# Patient Record
Sex: MALE | Race: WHITE | NOT HISPANIC OR LATINO
[De-identification: names, ages, dates, MRNs, and addresses within clinical notes are randomized per-mention and may not be internally consistent; named-entity substitution may affect disease eponyms.]

---

## 2023-11-28 ENCOUNTER — NON-APPOINTMENT (OUTPATIENT)
Age: 68
End: 2023-11-28

## 2023-11-28 PROBLEM — Z00.00 ENCOUNTER FOR PREVENTIVE HEALTH EXAMINATION: Status: ACTIVE | Noted: 2023-11-28

## 2023-11-29 DIAGNOSIS — M25.561 PAIN IN RIGHT KNEE: ICD-10-CM

## 2023-12-01 ENCOUNTER — APPOINTMENT (OUTPATIENT)
Dept: ORTHOPEDIC SURGERY | Facility: CLINIC | Age: 68
End: 2023-12-01
Payer: COMMERCIAL

## 2023-12-01 ENCOUNTER — NON-APPOINTMENT (OUTPATIENT)
Age: 68
End: 2023-12-01

## 2023-12-01 VITALS — HEIGHT: 68 IN | BODY MASS INDEX: 22.73 KG/M2 | WEIGHT: 150 LBS

## 2023-12-01 PROCEDURE — 99204 OFFICE O/P NEW MOD 45 MIN: CPT | Mod: 25

## 2023-12-01 PROCEDURE — 73562 X-RAY EXAM OF KNEE 3: CPT | Mod: RT

## 2023-12-01 PROCEDURE — 20610 DRAIN/INJ JOINT/BURSA W/O US: CPT | Mod: RT

## 2023-12-04 ENCOUNTER — TRANSCRIPTION ENCOUNTER (OUTPATIENT)
Age: 68
End: 2023-12-04

## 2023-12-04 LAB
B PERT IGG+IGM PNL SER: ABNORMAL
COLOR FLD: YELLOW
EOSINOPHIL # FLD MANUAL: 0 %
FLUID INTAKE SUBSTANCE CLASS: NORMAL
LYMPHOCYTES # FLD MANUAL: 26 %
MESOTHL CELL NFR FLD: 0 %
MONOS+MACROS NFR FLD MANUAL: 68 %
NEUTS SEG # FLD MANUAL: 6 %
NRBC # FLD: 0 %
RBC # FLD MANUAL: 4000 /UL
SYCRY CLARITY: ABNORMAL
SYCRY COLOR: ABNORMAL
SYCRY ID: ABNORMAL
SYCRY TUBE: NORMAL
TOTAL CELLS COUNTED FLD: 399 /UL
TUBE TYPE: NORMAL
UNIDENT CELLS NFR FLD MANUAL: 0 %
VARIANT LYMPHS # FLD MANUAL: 0 %

## 2023-12-07 LAB — GRAM STN ASPIRATE: NORMAL

## 2023-12-10 ENCOUNTER — NON-APPOINTMENT (OUTPATIENT)
Age: 68
End: 2023-12-10

## 2023-12-18 ENCOUNTER — APPOINTMENT (OUTPATIENT)
Dept: ORTHOPEDIC SURGERY | Facility: CLINIC | Age: 68
End: 2023-12-18
Payer: COMMERCIAL

## 2023-12-18 PROCEDURE — G2012 BRIEF CHECK IN BY MD/QHP: CPT

## 2024-02-16 ENCOUNTER — OUTPATIENT (OUTPATIENT)
Dept: OUTPATIENT SERVICES | Facility: HOSPITAL | Age: 69
LOS: 1 days | Discharge: HOME | End: 2024-02-16
Payer: COMMERCIAL

## 2024-02-16 VITALS
TEMPERATURE: 98 F | OXYGEN SATURATION: 99 % | RESPIRATION RATE: 17 BRPM | HEIGHT: 68 IN | DIASTOLIC BLOOD PRESSURE: 86 MMHG | WEIGHT: 169.32 LBS | HEART RATE: 65 BPM | SYSTOLIC BLOOD PRESSURE: 140 MMHG

## 2024-02-16 DIAGNOSIS — Z96.651 PRESENCE OF RIGHT ARTIFICIAL KNEE JOINT: Chronic | ICD-10-CM

## 2024-02-16 DIAGNOSIS — G47.33 OBSTRUCTIVE SLEEP APNEA (ADULT) (PEDIATRIC): ICD-10-CM

## 2024-02-16 DIAGNOSIS — Z01.818 ENCOUNTER FOR OTHER PREPROCEDURAL EXAMINATION: ICD-10-CM

## 2024-02-16 DIAGNOSIS — I10 ESSENTIAL (PRIMARY) HYPERTENSION: ICD-10-CM

## 2024-02-16 DIAGNOSIS — M25.561 PAIN IN RIGHT KNEE: ICD-10-CM

## 2024-02-16 DIAGNOSIS — T84.82XA FIBROSIS DUE TO INTERNAL ORTHOPEDIC PROSTHETIC DEVICES, IMPLANTS AND GRAFTS, INITIAL ENCOUNTER: ICD-10-CM

## 2024-02-16 DIAGNOSIS — L40.9 PSORIASIS, UNSPECIFIED: ICD-10-CM

## 2024-02-16 DIAGNOSIS — Z98.890 OTHER SPECIFIED POSTPROCEDURAL STATES: Chronic | ICD-10-CM

## 2024-02-16 DIAGNOSIS — Z90.3 ACQUIRED ABSENCE OF STOMACH [PART OF]: Chronic | ICD-10-CM

## 2024-02-16 LAB
A1C WITH ESTIMATED AVERAGE GLUCOSE RESULT: 5.3 % — SIGNIFICANT CHANGE UP (ref 4–5.6)
ALBUMIN SERPL ELPH-MCNC: 3.6 G/DL — SIGNIFICANT CHANGE UP (ref 3.3–5)
ALP SERPL-CCNC: 85 U/L — SIGNIFICANT CHANGE UP (ref 40–120)
ALT FLD-CCNC: 25 U/L — SIGNIFICANT CHANGE UP (ref 12–78)
ANION GAP SERPL CALC-SCNC: 2 MMOL/L — LOW (ref 5–17)
APPEARANCE UR: CLEAR — SIGNIFICANT CHANGE UP
APTT BLD: 30.4 SEC — SIGNIFICANT CHANGE UP (ref 24.5–35.6)
AST SERPL-CCNC: 21 U/L — SIGNIFICANT CHANGE UP (ref 15–37)
BASOPHILS # BLD AUTO: 0.05 K/UL — SIGNIFICANT CHANGE UP (ref 0–0.2)
BASOPHILS NFR BLD AUTO: 1 % — SIGNIFICANT CHANGE UP (ref 0–2)
BILIRUB SERPL-MCNC: 1.7 MG/DL — HIGH (ref 0.2–1.2)
BILIRUB UR-MCNC: NEGATIVE — SIGNIFICANT CHANGE UP
BLD GP AB SCN SERPL QL: SIGNIFICANT CHANGE UP
BUN SERPL-MCNC: 14 MG/DL — SIGNIFICANT CHANGE UP (ref 7–23)
CALCIUM SERPL-MCNC: 8.7 MG/DL — SIGNIFICANT CHANGE UP (ref 8.5–10.1)
CHLORIDE SERPL-SCNC: 108 MMOL/L — SIGNIFICANT CHANGE UP (ref 96–108)
CO2 SERPL-SCNC: 31 MMOL/L — SIGNIFICANT CHANGE UP (ref 22–31)
COLOR SPEC: YELLOW — SIGNIFICANT CHANGE UP
CREAT SERPL-MCNC: 0.97 MG/DL — SIGNIFICANT CHANGE UP (ref 0.5–1.3)
DIFF PNL FLD: NEGATIVE — SIGNIFICANT CHANGE UP
EGFR: 86 ML/MIN/1.73M2 — SIGNIFICANT CHANGE UP
EOSINOPHIL # BLD AUTO: 0.12 K/UL — SIGNIFICANT CHANGE UP (ref 0–0.5)
EOSINOPHIL NFR BLD AUTO: 2.4 % — SIGNIFICANT CHANGE UP (ref 0–6)
ESTIMATED AVERAGE GLUCOSE: 105 MG/DL — SIGNIFICANT CHANGE UP (ref 68–114)
GLUCOSE SERPL-MCNC: 82 MG/DL — SIGNIFICANT CHANGE UP (ref 70–99)
GLUCOSE UR QL: NEGATIVE MG/DL — SIGNIFICANT CHANGE UP
HCT VFR BLD CALC: 42.4 % — SIGNIFICANT CHANGE UP (ref 39–50)
HGB BLD-MCNC: 13.9 G/DL — SIGNIFICANT CHANGE UP (ref 13–17)
IMM GRANULOCYTES NFR BLD AUTO: 0.2 % — SIGNIFICANT CHANGE UP (ref 0–0.9)
INR BLD: 0.91 RATIO — SIGNIFICANT CHANGE UP (ref 0.85–1.18)
KETONES UR-MCNC: NEGATIVE MG/DL — SIGNIFICANT CHANGE UP
LEUKOCYTE ESTERASE UR-ACNC: NEGATIVE — SIGNIFICANT CHANGE UP
LYMPHOCYTES # BLD AUTO: 1.3 K/UL — SIGNIFICANT CHANGE UP (ref 1–3.3)
LYMPHOCYTES # BLD AUTO: 25.5 % — SIGNIFICANT CHANGE UP (ref 13–44)
MCHC RBC-ENTMCNC: 27.7 PG — SIGNIFICANT CHANGE UP (ref 27–34)
MCHC RBC-ENTMCNC: 32.8 G/DL — SIGNIFICANT CHANGE UP (ref 32–36)
MCV RBC AUTO: 84.5 FL — SIGNIFICANT CHANGE UP (ref 80–100)
MONOCYTES # BLD AUTO: 0.37 K/UL — SIGNIFICANT CHANGE UP (ref 0–0.9)
MONOCYTES NFR BLD AUTO: 7.3 % — SIGNIFICANT CHANGE UP (ref 2–14)
MRSA PCR RESULT.: SIGNIFICANT CHANGE UP
NEUTROPHILS # BLD AUTO: 3.25 K/UL — SIGNIFICANT CHANGE UP (ref 1.8–7.4)
NEUTROPHILS NFR BLD AUTO: 63.6 % — SIGNIFICANT CHANGE UP (ref 43–77)
NITRITE UR-MCNC: NEGATIVE — SIGNIFICANT CHANGE UP
NRBC # BLD: 0 /100 WBCS — SIGNIFICANT CHANGE UP (ref 0–0)
PH UR: 7 — SIGNIFICANT CHANGE UP (ref 5–8)
PLATELET # BLD AUTO: 235 K/UL — SIGNIFICANT CHANGE UP (ref 150–400)
POTASSIUM SERPL-MCNC: 3.9 MMOL/L — SIGNIFICANT CHANGE UP (ref 3.5–5.3)
POTASSIUM SERPL-SCNC: 3.9 MMOL/L — SIGNIFICANT CHANGE UP (ref 3.5–5.3)
PROT SERPL-MCNC: 6.5 GM/DL — SIGNIFICANT CHANGE UP (ref 6–8.3)
PROT UR-MCNC: NEGATIVE MG/DL — SIGNIFICANT CHANGE UP
PROTHROM AB SERPL-ACNC: 10.9 SEC — SIGNIFICANT CHANGE UP (ref 9.5–13)
RBC # BLD: 5.02 M/UL — SIGNIFICANT CHANGE UP (ref 4.2–5.8)
RBC # FLD: 13 % — SIGNIFICANT CHANGE UP (ref 10.3–14.5)
S AUREUS DNA NOSE QL NAA+PROBE: SIGNIFICANT CHANGE UP
SODIUM SERPL-SCNC: 141 MMOL/L — SIGNIFICANT CHANGE UP (ref 135–145)
SP GR SPEC: 1.01 — SIGNIFICANT CHANGE UP (ref 1–1.03)
UROBILINOGEN FLD QL: 1 MG/DL — SIGNIFICANT CHANGE UP (ref 0.2–1)
VIT D25+D1,25 OH+D1,25 PNL SERPL-MCNC: 39.6 PG/ML — SIGNIFICANT CHANGE UP (ref 19.9–79.3)
WBC # BLD: 5.1 K/UL — SIGNIFICANT CHANGE UP (ref 3.8–10.5)
WBC # FLD AUTO: 5.1 K/UL — SIGNIFICANT CHANGE UP (ref 3.8–10.5)

## 2024-02-16 PROCEDURE — 93010 ELECTROCARDIOGRAM REPORT: CPT

## 2024-02-16 NOTE — OCCUPATIONAL THERAPY INITIAL EVALUATION ADULT - PERTINENT HX OF CURRENT PROBLEM, REHAB EVAL
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 Pt is scheduled for R TKR revision on 3/7/24.

## 2024-02-16 NOTE — H&P PST ADULT - LYMPHATIC
Normal rate, regular rhythm.  Heart sounds S1, S2.  No murmurs, rubs or gallops.
No lymphadedenopathy

## 2024-02-16 NOTE — H&P PST ADULT - NSICDXPASTSURGICALHX_GEN_ALL_CORE_FT
PAST SURGICAL HISTORY:  H/O total knee replacement, right     History of ankle surgery     S/P gastric sleeve procedure     S/P right knee arthroscopy

## 2024-02-16 NOTE — H&P PST ADULT - NSICDXPASTMEDICALHX_GEN_ALL_CORE_FT
PAST MEDICAL HISTORY:  HTN (hypertension)     ALBERTINA (obstructive sleep apnea)     Psoriasis

## 2024-02-16 NOTE — OCCUPATIONAL THERAPY INITIAL EVALUATION ADULT - ADDITIONAL COMMENTS
Pt lives with wife (Who can assist post op) in a private house with 3 steps with bilateral handrails (Far apart) to enter. Once inside, the pt main bedroom and bathroom is on that floor when entering. The pts bathroom has a walk in shower stall, fixed/retractable shower head, comfort height toilet seat and grab bars +. The pt deferred the 3/1 commode stating "I didn't need it last time I had surgery. I will be fine". OT educated pt on importance of 3/1 commode, but pt still deferred. The pt ambulates with no device and owns a SAC and a RW. The pt daily pain is a 0/10 at rest and a 6/10 with movement. The pt manages the pain with rest and Advil. The pt has no recent outpatient PT, Hx of falls and has no buckling of the knees. The pt wears glasses all the time, R handed, drives and has no hearing impairments.

## 2024-02-16 NOTE — H&P PST ADULT - ASSESSMENT
67 y/o male with HTN, ALBERTINA, psoriasis with pshx of Bariatric sleeve gastrectomy 10/2022, Rt TKA on 3/2021 here for presurgical examination for planned revision Right Total Knee replacement with Dr. Hanson on 3/7/2024. Denies history of ischemic heart disease, CHF, DM, CVA, TIA, or Kidney Disease. Denies resting or exertional chest pain, palpitations, headache, N/V, SOB, syncope or blurry vision. Denies personal or family hx of bleeding disorder or adverse reaction with anesthesia. Denies hx of DVT or PE. Denies recent travels in the past 30 days. No fever, SOB, cough, flu-like symptoms or body rash covid screen.  Goal To walk pain free  CAPRINI SCORE [CLOT]    AGE RELATED RISK FACTORS                                                       MOBILITY RELATED FACTORS  [ ] Age 41-60 years                                            (1 Point)                  [ ] Bed rest                                                        (1 Point)  [x ] Age: 61-74 years                                           (2 Points)                 [ ] Plaster cast                                                   (2 Points)  [ ] Age= 75 years                                              (3 Points)                 [ ] Bed bound for more than 72 hours                 (2 Points)    DISEASE RELATED RISK FACTORS                                               GENDER SPECIFIC FACTORS  [ ] Edema in the lower extremities                       (1 Point)                  [ ] Pregnancy                                                     (1 Point)  [ ] Varicose veins                                               (1 Point)                  [ ] Post-partum < 6 weeks                                   (1 Point)             [x ] BMI > 25 Kg/m2                                            (1 Point)                  [ ] Hormonal therapy  or oral contraception          (1 Point)                 [ ] Sepsis (in the previous month)                        (1 Point)                  [ ] History of pregnancy complications                 (1 point)  [ ] Pneumonia or serious lung disease                                               [ ] Unexplained or recurrent                     (1 Point)           (in the previous month)                               (1 Point)  [ ] Abnormal pulmonary function test                     (1 Point)                 SURGERY RELATED RISK FACTORS  [ ] Acute myocardial infarction                              (1 Point)                 [ ]  Section                                             (1 Point)  [ ] Congestive heart failure (in the previous month)  (1 Point)               [ ] Minor surgery                                                  (1 Point)   [ ] Inflammatory bowel disease                             (1 Point)                 [ ] Arthroscopic surgery                                        (2 Points)  [ ] Central venous access                                      (2 Points)                [ ] General surgery lasting more than 45 minutes   (2 Points)       [ ] Stroke (in the previous month)                          (5 Points)               [x ] Elective arthroplasty                                         (5 Points)                                                                                                                                               HEMATOLOGY RELATED FACTORS                                                 TRAUMA RELATED RISK FACTORS  [ ] Prior episodes of VTE                                     (3 Points)                [ ] Fracture of the hip, pelvis, or leg                       (5 Points)  [ ] Positive family history for VTE                         (3 Points)                 [ ] Acute spinal cord injury (in the previous month)  (5 Points)  [ ] Prothrombin 34192 A                                     (3 Points)                 [ ] Paralysis  (less than 1 month)                             (5 Points)  [ ] Factor V Leiden                                             (3 Points)                  [ ] Multiple Trauma within 1 month                        (5 Points)  [ ] Lupus anticoagulants                                     (3 Points)                                                           [ ] Anticardiolipin antibodies                               (3 Points)                                                       [ ] High homocysteine in the blood                      (3 Points)                                             [ ] Other congenital or acquired thrombophilia      (3 Points)                                                [ ] Heparin induced thrombocytopenia                  (3 Points)                                          Total Score [      8    ]    Caprini Score 0 - 2:  Low Risk, No VTE Prophylaxis required for most patients, encourage ambulation  Caprini Score 3 - 6:  At Risk, pharmacologic VTE prophylaxis is indicated for most patients (in the absence of a contraindication)  Caprini Score Greater than or = 7:  High Risk, pharmacologic VTE prophylaxis is indicated for most patients (in the absence of a contraindication)  Caprini Score 0-2: Low risk, No VTE Prophylaxis required for most patient's, encourage ambulation  Caprini Score 3-6: At Risk, Pharmacologic VTE prophylaxis is indicated for most patients ( in the absence of a contraindication)  Caprini Score Greater than or = 7: High Risk , pharmacologic VTE is indicated for most patients ( in the absence of a contraindication)    Caprini score indicates that the patient is high risk for VTE event ( score 6 or greater). Surgical patient's in this group will benefit from both pharmacologic prophylaxis and intermittent compression devices . Surgical team will determine the balance between VTE  risk and bleeding risk and other clinical considerations

## 2024-02-16 NOTE — OCCUPATIONAL THERAPY INITIAL EVALUATION ADULT - NSOTDISCHREC_GEN_A_CORE
Home with home OT for home safety evaluation and to improve functional ADLs and transfers/mobility. Pt is for inpt (Full stay)./Home OT

## 2024-02-16 NOTE — H&P PST ADULT - HISTORY OF PRESENT ILLNESS
67 y/o male with HTN, ALBERTINA, psoriasis with pshx of Bariatric sleeve gastrectomy 10/2022, Rt TKA on 3/2021 here for presurgical examination for planned revision Right Total Knee replacement with Dr. Hanson on 3/7/2024. Denies history of ischemic heart disease, CHF, DM, CVA, TIA, or Kidney Disease. Denies resting or exertional chest pain, palpitations, headache, N/V, SOB, syncope or blurry vision. Denies personal or family hx of bleeding disorder or adverse reaction with anesthesia. Denies hx of DVT or PE. Denies recent travels in the past 30 days. No fever, SOB, cough, flu-like symptoms or body rash covid screen.  Goal To walk pain free

## 2024-02-16 NOTE — H&P PST ADULT - FUNCTIONAL STATUS
can go up 2 flight of stairs continuously without SOB and or chest pain.  swims 2x per week for 45 mins/4-10 METS

## 2024-02-17 LAB
CULTURE RESULTS: SIGNIFICANT CHANGE UP
SPECIMEN SOURCE: SIGNIFICANT CHANGE UP

## 2024-02-20 PROBLEM — L40.9 PSORIASIS, UNSPECIFIED: Chronic | Status: ACTIVE | Noted: 2024-02-16

## 2024-02-20 PROBLEM — I10 ESSENTIAL (PRIMARY) HYPERTENSION: Chronic | Status: ACTIVE | Noted: 2024-02-16

## 2024-02-20 RX ORDER — OXYCODONE HYDROCHLORIDE 5 MG/1
5 TABLET ORAL
Refills: 0 | Status: DISCONTINUED | OUTPATIENT
Start: 2024-03-07 | End: 2024-03-08

## 2024-02-20 RX ORDER — POLYETHYLENE GLYCOL 3350 17 G/17G
17 POWDER, FOR SOLUTION ORAL AT BEDTIME
Refills: 0 | Status: DISCONTINUED | OUTPATIENT
Start: 2024-03-07 | End: 2024-03-08

## 2024-02-20 RX ORDER — SENNA PLUS 8.6 MG/1
2 TABLET ORAL AT BEDTIME
Refills: 0 | Status: DISCONTINUED | OUTPATIENT
Start: 2024-03-07 | End: 2024-03-08

## 2024-02-20 RX ORDER — SODIUM CHLORIDE 9 MG/ML
1000 INJECTION, SOLUTION INTRAVENOUS
Refills: 0 | Status: DISCONTINUED | OUTPATIENT
Start: 2024-03-07 | End: 2024-03-08

## 2024-02-20 RX ORDER — ACETAMINOPHEN 500 MG
1000 TABLET ORAL EVERY 8 HOURS
Refills: 0 | Status: DISCONTINUED | OUTPATIENT
Start: 2024-03-07 | End: 2024-03-08

## 2024-02-20 RX ORDER — TRAMADOL HYDROCHLORIDE 50 MG/1
50 TABLET ORAL EVERY 6 HOURS
Refills: 0 | Status: DISCONTINUED | OUTPATIENT
Start: 2024-03-07 | End: 2024-03-08

## 2024-02-20 RX ORDER — FOLIC ACID 0.8 MG
1 TABLET ORAL DAILY
Refills: 0 | Status: DISCONTINUED | OUTPATIENT
Start: 2024-03-07 | End: 2024-03-08

## 2024-02-20 RX ORDER — MAGNESIUM HYDROXIDE 400 MG/1
30 TABLET, CHEWABLE ORAL DAILY
Refills: 0 | Status: DISCONTINUED | OUTPATIENT
Start: 2024-03-07 | End: 2024-03-08

## 2024-02-20 RX ORDER — HYDROMORPHONE HYDROCHLORIDE 2 MG/ML
0.5 INJECTION INTRAMUSCULAR; INTRAVENOUS; SUBCUTANEOUS
Refills: 0 | Status: DISCONTINUED | OUTPATIENT
Start: 2024-03-07 | End: 2024-03-08

## 2024-02-20 RX ORDER — ONDANSETRON 8 MG/1
4 TABLET, FILM COATED ORAL EVERY 6 HOURS
Refills: 0 | Status: DISCONTINUED | OUTPATIENT
Start: 2024-03-07 | End: 2024-03-08

## 2024-02-20 RX ORDER — LISINOPRIL 2.5 MG/1
20 TABLET ORAL DAILY
Refills: 0 | Status: DISCONTINUED | OUTPATIENT
Start: 2024-03-07 | End: 2024-03-08

## 2024-02-20 RX ORDER — CELECOXIB 200 MG/1
200 CAPSULE ORAL EVERY 12 HOURS
Refills: 0 | Status: DISCONTINUED | OUTPATIENT
Start: 2024-03-07 | End: 2024-03-08

## 2024-02-20 RX ORDER — ACETAMINOPHEN 500 MG
1000 TABLET ORAL ONCE
Refills: 0 | Status: DISCONTINUED | OUTPATIENT
Start: 2024-03-07 | End: 2024-03-08

## 2024-02-20 RX ORDER — OXYCODONE HYDROCHLORIDE 5 MG/1
10 TABLET ORAL
Refills: 0 | Status: DISCONTINUED | OUTPATIENT
Start: 2024-03-07 | End: 2024-03-08

## 2024-02-20 RX ORDER — PANTOPRAZOLE SODIUM 20 MG/1
40 TABLET, DELAYED RELEASE ORAL
Refills: 0 | Status: DISCONTINUED | OUTPATIENT
Start: 2024-03-07 | End: 2024-03-08

## 2024-02-20 RX ORDER — APIXABAN 2.5 MG/1
2.5 TABLET, FILM COATED ORAL EVERY 12 HOURS
Refills: 0 | Status: DISCONTINUED | OUTPATIENT
Start: 2024-03-08 | End: 2024-03-08

## 2024-03-01 ENCOUNTER — APPOINTMENT (OUTPATIENT)
Dept: ORTHOPEDIC SURGERY | Facility: CLINIC | Age: 69
End: 2024-03-01
Payer: COMMERCIAL

## 2024-03-01 VITALS — WEIGHT: 160 LBS | HEIGHT: 68 IN | BODY MASS INDEX: 24.25 KG/M2

## 2024-03-01 PROCEDURE — 73562 X-RAY EXAM OF KNEE 3: CPT | Mod: RT

## 2024-03-01 PROCEDURE — 99214 OFFICE O/P EST MOD 30 MIN: CPT

## 2024-03-01 NOTE — HISTORY OF PRESENT ILLNESS
[de-identified] : NYDIA ZAIDI 69 year old male presents for follow-up evaluation of his right knee. He is scheduled to have a right revision TKR on March 7th and is just coming in for a preoperative visit.

## 2024-03-01 NOTE — DISCUSSION/SUMMARY
[de-identified] : Discussed at length the nature of the patient's condition. Their right knee symptoms appear secondary to arthrofibrosis from his right TKR. We have once again reviewed the aspiration and blood tests, which both show no sign of infection. I have explained that he has a limited range of motion and hopefully he will gain more motion of the knee joint. I also reiterated that he may not get back his normal range of motion due to his motion before the surgery. The patient understands.  Patient can continue home exercises, weight management and activities as tolerated. All questions were answered, understanding verbalized. Patient is in agreement with plan of treatment. Patient may follow up as needed.

## 2024-03-01 NOTE — CONSULT LETTER
[Dear  ___] : Dear  [unfilled], [Consult Letter:] : I had the pleasure of evaluating your patient, [unfilled]. [Please see my note below.] : Please see my note below. [Consult Closing:] : Thank you very much for allowing me to participate in the care of this patient.  If you have any questions, please do not hesitate to contact me. [Sincerely,] : Sincerely, [FreeTextEntry2] : PITA HORN

## 2024-03-01 NOTE — ADDENDUM
[FreeTextEntry1] : This note was written by Mauricio Vega on 03/01/2024 acting as scribe for Dr. Baljit CRISTOBAL I, Dr. Baljit Hanson, have read and attest that all the information, medical decision making and discharge instructions within are true and accurate.  This note was written by Shawn Bolaños on 03/01/2024 acting as scribe for Dr. Baljit CRISTOBAL I, Dr. Baljit Hanson, have read and attest that all the information, medical decision making and discharge instructions within are true and accurate.

## 2024-03-01 NOTE — PHYSICAL EXAM
[de-identified] : General appearance: well-nourished and hydrated, pleasant, alert and oriented x 3, cooperative. HEENT: Normocephalic, EOM intact, Nasal septum midline, Oral cavity clear, External auditory canal clear. Cardiovascular: no apparent abnormalities, no lower leg edema, no varicosities, pedal pulses are palpable. Lymphatics Lymph nodes: none palpated, Lymphedema: not present. Neurologic: sensation is normal, no muscle weakness in upper or lower extremities, patella tendon reflexes intact. Dermatologic no apparent skin lesions, moist, warm, no rash. Spine: cervical spine appears normal and moves freely, thoracic spine appears normal and moves freely, lumbosacral spine appears normal and moves freely. Gait: nonantalgic.   Right knee Inspection: no effusion, no erythema. Mild soft tissue swelling.  Wounds: healed midline incision. Alignment: normal. Palpation: no specific tenderness on palpation. ROM active (in degrees): 0-70 Ligamentous laxity: trace lateral laxity in extension, trace medial lateral laxity in flexion, negative ant. drawer test, negative post. drawer test, stable to varus stress test, stable to valgus stress test. Patellofemoral Alignment Test: Q angle-, normal. Muscle Test: good quad strength. Leg examination: calf is soft and non-tender. [de-identified] : Right knee x-ray, AP, lateral, merchant view taken at the office today demonstrates a total knee replacement (which appears to be a persona CR) in satisfactory position and alignment. Implant appears to be appropriately sized. No evidence of loosening. The patella sits in a central position. Femoral screws noted from prior ACL reconstruction.   Left knee x-ray merchant view taken at the office today demonstrates mild joint space narrowing and a well centered patella.

## 2024-03-07 ENCOUNTER — TRANSCRIPTION ENCOUNTER (OUTPATIENT)
Age: 69
End: 2024-03-07

## 2024-03-07 ENCOUNTER — RESULT REVIEW (OUTPATIENT)
Age: 69
End: 2024-03-07

## 2024-03-07 ENCOUNTER — APPOINTMENT (OUTPATIENT)
Dept: ORTHOPEDIC SURGERY | Facility: HOSPITAL | Age: 69
End: 2024-03-07

## 2024-03-07 ENCOUNTER — INPATIENT (INPATIENT)
Facility: HOSPITAL | Age: 69
LOS: 0 days | Discharge: HOME HEALTH | End: 2024-03-08
Attending: ORTHOPAEDIC SURGERY | Admitting: ORTHOPAEDIC SURGERY
Payer: COMMERCIAL

## 2024-03-07 VITALS
TEMPERATURE: 98 F | SYSTOLIC BLOOD PRESSURE: 151 MMHG | OXYGEN SATURATION: 98 % | RESPIRATION RATE: 16 BRPM | HEART RATE: 67 BPM | HEIGHT: 68 IN | WEIGHT: 160.06 LBS | DIASTOLIC BLOOD PRESSURE: 83 MMHG

## 2024-03-07 DIAGNOSIS — Z98.890 OTHER SPECIFIED POSTPROCEDURAL STATES: Chronic | ICD-10-CM

## 2024-03-07 DIAGNOSIS — Z90.49 ACQUIRED ABSENCE OF OTHER SPECIFIED PARTS OF DIGESTIVE TRACT: Chronic | ICD-10-CM

## 2024-03-07 DIAGNOSIS — Z96.651 PRESENCE OF RIGHT ARTIFICIAL KNEE JOINT: Chronic | ICD-10-CM

## 2024-03-07 DIAGNOSIS — Z90.3 ACQUIRED ABSENCE OF STOMACH [PART OF]: Chronic | ICD-10-CM

## 2024-03-07 LAB
ANION GAP SERPL CALC-SCNC: 4 MMOL/L — LOW (ref 5–17)
BUN SERPL-MCNC: 11 MG/DL — SIGNIFICANT CHANGE UP (ref 7–23)
CALCIUM SERPL-MCNC: 8.5 MG/DL — SIGNIFICANT CHANGE UP (ref 8.5–10.1)
CHLORIDE SERPL-SCNC: 110 MMOL/L — HIGH (ref 96–108)
CO2 SERPL-SCNC: 27 MMOL/L — SIGNIFICANT CHANGE UP (ref 22–31)
CREAT SERPL-MCNC: 0.99 MG/DL — SIGNIFICANT CHANGE UP (ref 0.5–1.3)
EGFR: 83 ML/MIN/1.73M2 — SIGNIFICANT CHANGE UP
GLUCOSE BLDC GLUCOMTR-MCNC: 71 MG/DL — SIGNIFICANT CHANGE UP (ref 70–99)
GLUCOSE SERPL-MCNC: 113 MG/DL — HIGH (ref 70–99)
HCT VFR BLD CALC: 38 % — LOW (ref 39–50)
HGB BLD-MCNC: 12.5 G/DL — LOW (ref 13–17)
MCHC RBC-ENTMCNC: 27.7 PG — SIGNIFICANT CHANGE UP (ref 27–34)
MCHC RBC-ENTMCNC: 32.9 G/DL — SIGNIFICANT CHANGE UP (ref 32–36)
MCV RBC AUTO: 84.3 FL — SIGNIFICANT CHANGE UP (ref 80–100)
NRBC # BLD: 0 /100 WBCS — SIGNIFICANT CHANGE UP (ref 0–0)
PLATELET # BLD AUTO: 241 K/UL — SIGNIFICANT CHANGE UP (ref 150–400)
POTASSIUM SERPL-MCNC: 4 MMOL/L — SIGNIFICANT CHANGE UP (ref 3.5–5.3)
POTASSIUM SERPL-SCNC: 4 MMOL/L — SIGNIFICANT CHANGE UP (ref 3.5–5.3)
RBC # BLD: 4.51 M/UL — SIGNIFICANT CHANGE UP (ref 4.2–5.8)
RBC # FLD: 13 % — SIGNIFICANT CHANGE UP (ref 10.3–14.5)
SODIUM SERPL-SCNC: 141 MMOL/L — SIGNIFICANT CHANGE UP (ref 135–145)
WBC # BLD: 6.57 K/UL — SIGNIFICANT CHANGE UP (ref 3.8–10.5)
WBC # FLD AUTO: 6.57 K/UL — SIGNIFICANT CHANGE UP (ref 3.8–10.5)

## 2024-03-07 PROCEDURE — 88300 SURGICAL PATH GROSS: CPT | Mod: 26,59

## 2024-03-07 PROCEDURE — 88305 TISSUE EXAM BY PATHOLOGIST: CPT | Mod: 26

## 2024-03-07 PROCEDURE — 73560 X-RAY EXAM OF KNEE 1 OR 2: CPT | Mod: 26,RT

## 2024-03-07 PROCEDURE — 88331 PATH CONSLTJ SURG 1 BLK 1SPC: CPT | Mod: 26

## 2024-03-07 PROCEDURE — 27487 REVISE/REPLACE KNEE JOINT: CPT | Mod: RT

## 2024-03-07 DEVICE — IMPLANTABLE DEVICE: Type: IMPLANTABLE DEVICE | Site: RIGHT | Status: FUNCTIONAL

## 2024-03-07 DEVICE — COMP FEM PSN REV CEM STD SZ 9 RT: Type: IMPLANTABLE DEVICE | Site: RIGHT | Status: FUNCTIONAL

## 2024-03-07 RX ORDER — ACETAMINOPHEN 500 MG
650 TABLET ORAL ONCE
Refills: 0 | Status: COMPLETED | OUTPATIENT
Start: 2024-03-07 | End: 2024-03-07

## 2024-03-07 RX ORDER — SODIUM CHLORIDE 9 MG/ML
1000 INJECTION, SOLUTION INTRAVENOUS
Refills: 0 | Status: DISCONTINUED | OUTPATIENT
Start: 2024-03-07 | End: 2024-03-07

## 2024-03-07 RX ORDER — LANOLIN ALCOHOL/MO/W.PET/CERES
3 CREAM (GRAM) TOPICAL AT BEDTIME
Refills: 0 | Status: DISCONTINUED | OUTPATIENT
Start: 2024-03-07 | End: 2024-03-08

## 2024-03-07 RX ORDER — SODIUM CHLORIDE 9 MG/ML
3 INJECTION INTRAMUSCULAR; INTRAVENOUS; SUBCUTANEOUS EVERY 8 HOURS
Refills: 0 | Status: DISCONTINUED | OUTPATIENT
Start: 2024-03-07 | End: 2024-03-07

## 2024-03-07 RX ORDER — ONDANSETRON 8 MG/1
1 TABLET, FILM COATED ORAL
Qty: 21 | Refills: 0
Start: 2024-03-07 | End: 2024-03-13

## 2024-03-07 RX ORDER — CEFAZOLIN SODIUM 1 G
2000 VIAL (EA) INJECTION EVERY 8 HOURS
Refills: 0 | Status: COMPLETED | OUTPATIENT
Start: 2024-03-07 | End: 2024-03-08

## 2024-03-07 RX ORDER — OXYCODONE HYDROCHLORIDE 5 MG/1
1 TABLET ORAL
Qty: 28 | Refills: 0
Start: 2024-03-07 | End: 2024-03-13

## 2024-03-07 RX ORDER — ONDANSETRON 8 MG/1
4 TABLET, FILM COATED ORAL ONCE
Refills: 0 | Status: DISCONTINUED | OUTPATIENT
Start: 2024-03-07 | End: 2024-03-07

## 2024-03-07 RX ORDER — OXYCODONE HYDROCHLORIDE 5 MG/1
5 TABLET ORAL ONCE
Refills: 0 | Status: DISCONTINUED | OUTPATIENT
Start: 2024-03-07 | End: 2024-03-07

## 2024-03-07 RX ORDER — APIXABAN 2.5 MG/1
1 TABLET, FILM COATED ORAL
Qty: 28 | Refills: 0
Start: 2024-03-07 | End: 2024-03-20

## 2024-03-07 RX ORDER — APREMILAST 10-20-30MG
1 KIT ORAL
Refills: 0 | DISCHARGE

## 2024-03-07 RX ORDER — CELECOXIB 200 MG/1
1 CAPSULE ORAL
Qty: 30 | Refills: 0
Start: 2024-03-07 | End: 2024-04-05

## 2024-03-07 RX ORDER — DOCUSATE SODIUM 100 MG
1 CAPSULE ORAL
Qty: 14 | Refills: 0
Start: 2024-03-07 | End: 2024-03-13

## 2024-03-07 RX ORDER — LISINOPRIL 2.5 MG/1
1 TABLET ORAL
Refills: 0 | DISCHARGE

## 2024-03-07 RX ORDER — CELECOXIB 200 MG/1
200 CAPSULE ORAL ONCE
Refills: 0 | Status: COMPLETED | OUTPATIENT
Start: 2024-03-07 | End: 2024-03-07

## 2024-03-07 RX ORDER — BENZOCAINE AND MENTHOL 5; 1 G/100ML; G/100ML
1 LIQUID ORAL DAILY
Refills: 0 | Status: DISCONTINUED | OUTPATIENT
Start: 2024-03-07 | End: 2024-03-08

## 2024-03-07 RX ORDER — ACETAMINOPHEN 500 MG
1000 TABLET ORAL ONCE
Refills: 0 | Status: DISCONTINUED | OUTPATIENT
Start: 2024-03-07 | End: 2024-03-08

## 2024-03-07 RX ORDER — HYDROMORPHONE HYDROCHLORIDE 2 MG/ML
0.5 INJECTION INTRAMUSCULAR; INTRAVENOUS; SUBCUTANEOUS
Refills: 0 | Status: DISCONTINUED | OUTPATIENT
Start: 2024-03-07 | End: 2024-03-07

## 2024-03-07 RX ADMIN — POLYETHYLENE GLYCOL 3350 17 GRAM(S): 17 POWDER, FOR SOLUTION ORAL at 22:02

## 2024-03-07 RX ADMIN — SODIUM CHLORIDE 75 MILLILITER(S): 9 INJECTION, SOLUTION INTRAVENOUS at 22:02

## 2024-03-07 RX ADMIN — Medication 650 MILLIGRAM(S): at 12:31

## 2024-03-07 RX ADMIN — SENNA PLUS 2 TABLET(S): 8.6 TABLET ORAL at 22:01

## 2024-03-07 RX ADMIN — TRAMADOL HYDROCHLORIDE 50 MILLIGRAM(S): 50 TABLET ORAL at 23:38

## 2024-03-07 RX ADMIN — Medication 1000 MILLIGRAM(S): at 23:00

## 2024-03-07 RX ADMIN — SODIUM CHLORIDE 75 MILLILITER(S): 9 INJECTION, SOLUTION INTRAVENOUS at 20:05

## 2024-03-07 RX ADMIN — Medication 1000 MILLIGRAM(S): at 22:01

## 2024-03-07 RX ADMIN — Medication 100 MILLIGRAM(S): at 22:02

## 2024-03-07 RX ADMIN — CELECOXIB 200 MILLIGRAM(S): 200 CAPSULE ORAL at 12:30

## 2024-03-07 RX ADMIN — Medication 3 MILLIGRAM(S): at 22:02

## 2024-03-07 RX ADMIN — SODIUM CHLORIDE 75 MILLILITER(S): 9 INJECTION, SOLUTION INTRAVENOUS at 18:29

## 2024-03-07 NOTE — PHYSICAL THERAPY INITIAL EVALUATION ADULT - CRITERIA FOR SKILLED THERAPEUTIC INTERVENTIONS
home with home PT/impairments found/functional limitations in following categories/risk reduction/prevention/rehab potential/therapy frequency/predicted duration of therapy intervention/anticipated equipment needs at discharge/anticipated discharge recommendation

## 2024-03-07 NOTE — PHYSICAL THERAPY INITIAL EVALUATION ADULT - LEVEL OF INDEPENDENCE, REHAB EVAL
supervision A-T Advancement Flap Text: The defect edges were debeveled with a #15 scalpel blade.  Given the location of the defect, shape of the defect and the proximity to free margins an A-T advancement flap was deemed most appropriate.  Using a sterile surgical marker, an appropriate advancement flap was drawn incorporating the defect and placing the expected incisions within the relaxed skin tension lines where possible.    The area thus outlined was incised deep to adipose tissue with a #15 scalpel blade.  The skin margins were undermined to an appropriate distance in all directions utilizing iris scissors.

## 2024-03-07 NOTE — PATIENT PROFILE ADULT - DO YOU FEEL UNSAFE AT HOME, WORK, OR SCHOOL?
Pt placed on home bipap for HS at this time:       03/09/22 2118   Oxygen Therapy   O2 Sat (%) 100 %   Pulse via Oximetry 104 beats per minute   O2 Device BIPAP   O2 Flow Rate (L/min) 2 l/min   FIO2 (%) 28 %   CPAP/BIPAP   CPAP/BIPAP Start/Stop On   Device Mode BIPAP   Mask Type and Size Nasal prongs   IPAP (cm H2O) 9 cm H2O   EPAP (cm H2O) 5 cm H2O   Total RR (Spontaneous) 18 breaths per minute   Pt's Home Machine Yes (type/vendor)   Settings Verified Yes   Oxygen Therapy   NIPPV Yes no

## 2024-03-07 NOTE — PHYSICAL THERAPY INITIAL EVALUATION ADULT - ACTIVE RANGE OF MOTION EXAMINATION, REHAB EVAL
R knee flexion 75 degrees, extension - 10 degrees/Left LE Active ROM was WFL (within functional limits)

## 2024-03-07 NOTE — DISCHARGE NOTE PROVIDER - NSDCMRMEDTOKEN_GEN_ALL_CORE_FT
lisinopril 20 mg oral tablet: 1 tab(s) orally once a day  Otezla 30 mg oral tablet: 1 tab(s) orally once a day   aspirin 81 mg oral delayed release tablet: 1 tab(s) orally 2 times a day After you finish your 14 day course of Eliquis, take Aspirin 81mg twice a day for 30 days.  CeleBREX 200 mg oral capsule: 1 cap(s) orally once a day  Colace 100 mg oral capsule: 1 cap(s) orally 2 times a day as needed for  constipation  Eliquis 2.5 mg oral tablet: 1 tab(s) orally 2 times a day  lisinopril 20 mg oral tablet: 1 tab(s) orally once a day  ondansetron 4 mg oral tablet: 1 tab(s) orally every 8 hours as needed for  nausea  Otezla 30 mg oral tablet: 1 tab(s) orally once a day  oxyCODONE 5 mg oral tablet: 1 tab(s) orally every 6 hours as needed for  severe pain MDD: 4

## 2024-03-07 NOTE — PHYSICAL THERAPY INITIAL EVALUATION ADULT - RANGE OF MOTION EXAMINATION, REHAB EVAL
R knee flexion 75 degrees, extension - 10 degrees./Left LE ROM was WFL (within functional limits)/deficits as listed below

## 2024-03-07 NOTE — PROGRESS NOTE ADULT - SUBJECTIVE AND OBJECTIVE BOX
Postop check s/p Rev R TKA    Pt seen at bedside. No acute complaints, pain is well managed. Denies numbness, tingling, fevers, chills, CP, SOB, N/V/D.    Vital Signs (24 Hrs):  T(C): 36.7 (03-07-24 @ 17:50), Max: 36.8 (03-07-24 @ 11:47)  HR: 64 (03-07-24 @ 17:50) (64 - 70)  BP: 139/87 (03-07-24 @ 17:50) (120/75 - 151/83)  RR: 18 (03-07-24 @ 17:50) (9 - 18)  SpO2: 99% (03-07-24 @ 17:50) (96% - 100%)  Wt(kg): --    LABS:                          12.5   6.57  )-----------( 241      ( 07 Mar 2024 17:00 )             38.0     03-07    141  |  110<H>  |  11  ----------------------------<  113<H>  4.0   |  27  |  0.99    Ca    8.5      07 Mar 2024 17:00    Physical Exam:  General: NAD, pt laying in bed comfortably  SCDs in place BL    RLE:  Dressings C/D/I  Hemovac drain in place  Compartments soft, compressible  Calves nontender  Motor: +GSC, TA, EHL, FHL  SILT: SPN, DPN, Tib, Saph, Gely  +DP    A/P: 69M POD0 Rev R TKA    WBAT  PT/OT  Follow up PACU labs  Analgesics  Record drain output  Continue postop ABx for 24 hrs  DVT PPx start Eliquis POD1 tomorrow  Incentive spirometry  Dispo: pending PT eval  Appreciate medical recs  Will discuss plan with Dr. Hanson and adjust plan as necessary

## 2024-03-07 NOTE — PATIENT PROFILE ADULT - FALL HARM RISK - HARM RISK INTERVENTIONS

## 2024-03-07 NOTE — DISCHARGE NOTE PROVIDER - NSDCFUSCHEDAPPT_GEN_ALL_CORE_FT
Baljit Hanson  [facility]  ORTHOSUR [address]  Scheduled Appointment: 03/27/2024

## 2024-03-07 NOTE — PHYSICAL THERAPY INITIAL EVALUATION ADULT - ADDITIONAL COMMENTS
Pt lives with wife (Who can assist post op) in a private house with 3 steps with bilateral handrails (Far apart) to enter. Once inside, the pt main bedroom and bathroom is on that floor when entering. The pts bathroom has a walk in shower stall, fixed/retractable shower head, comfort height toilet seat and grab bars +. The pt deferred the 3/1 commode stating "I didn't need it last time I had surgery. I will be fine". The pt ambulates with no device and owns a SAC and a RW.  The pt has no recent outpatient PT, Hx of falls and has no buckling of the knees. The pt wears glasses all the time, R handed, drives and has no hearing impairments.

## 2024-03-07 NOTE — DISCHARGE NOTE PROVIDER - NSDCFUADDINST_GEN_ALL_CORE_FT
1.	Pain Control PRN, pain medications were sent to your pharmacy, please pick them up on your way home  2.	Walking with full weight bearing as tolerated, with assistive devices (walker/Cane as Needed)  3.	DVT Prophylaxis, Eliquis 2.5mg BID for 14 days. Day 15 start Aspirin 81 mg twice a day for 30 days. Do NOT skip doses.  4.	PT as needed  5.	Please call the office and make an appointment for suture/staple removal on post operative day 21. [phone]  6.	Remove Dressing Post-Op Day 10-14, with Dry dressing and Daily Dressing Changes as Need for saturation / strike through. Please keep clean dry and intact; If prevena in place, please remove day 5-7, replace with aquacel dressing  7.	Ice/Elevate affected area as Needed  8.	Keep Dressing Clean and dry.  9.     OK to shower w/ aquacel, DO NOT Submerge. No direct water contact. OK to wrap with Siran wrap for added protection

## 2024-03-07 NOTE — PHYSICAL THERAPY INITIAL EVALUATION ADULT - MANUAL MUSCLE TESTING RESULTS, REHAB EVAL
R LE 3/5, L LE 4/5/grossly assessed due to Cheiloplasty (Complex) Text: A decision was made to reconstruct the defect with a  cheiloplasty.  The defect was undermined extensively.  Additional obicularis oris muscle was excised with a 15 blade scalpel.  The defect was converted into a full thickness wedge to facilite a better cosmetic result.  Small vessels were then tied off with 5-0 monocyrl. The obicularis oris, superficial fascia, adipose and dermis were then reapproximated.  After the deeper layers were approximated the epidermis was reapproximated with particular care given to realign the vermilion border.

## 2024-03-07 NOTE — DISCHARGE NOTE PROVIDER - CARE PROVIDER_API CALL
Baljit Hanson.  Orthopaedic Surgery  [address] [facility], [address]  [place] [address]  Phone: [phone]  Fax: [phone]  Follow Up Time:

## 2024-03-07 NOTE — DISCHARGE NOTE PROVIDER - NSDCCPCAREPLAN_GEN_ALL_CORE_FT
PRINCIPAL DISCHARGE DIAGNOSIS  Diagnosis: Arthrofibrosis of knee joint, right  Assessment and Plan of Treatment:

## 2024-03-07 NOTE — DISCHARGE NOTE PROVIDER - HOSPITAL COURSE
Hospital Course:  The patient is a 69y Male  status post elective Rev Total Knee Arthroplasty to the R knee after failing outpatient nonoperative conservative management. Patient presented to [facility] after being medically cleared for an elective surgical procedure. The patient was taken to the operating room on date mentioned above. Prophylactic antibiotics were started before the procedure and continued for 24 hours. There were no complications during the procedure and patient tolerated the procedure well. The patient was transferred to the recovery room in stable condition and subsequently to the surgical floor. The patient was placed on Eliquis 2.5mg BID for anticoagulation while in house to continue until POD 14, then starting POD 15 patient instructed to start ASA 81mg PO BID for a total of 30 days. All home medications were continued. The patient received physical therapy daily and daily labs were followed. The hemovac drain was DC'd on POD #1. The dressing was kept clean, dry, intact. The rest of the hospital stay was unremarkable.

## 2024-03-08 ENCOUNTER — TRANSCRIPTION ENCOUNTER (OUTPATIENT)
Age: 69
End: 2024-03-08

## 2024-03-08 VITALS
SYSTOLIC BLOOD PRESSURE: 143 MMHG | HEART RATE: 81 BPM | RESPIRATION RATE: 18 BRPM | TEMPERATURE: 98 F | OXYGEN SATURATION: 98 % | DIASTOLIC BLOOD PRESSURE: 82 MMHG

## 2024-03-08 LAB
ANION GAP SERPL CALC-SCNC: 4 MMOL/L — LOW (ref 5–17)
BUN SERPL-MCNC: 12 MG/DL — SIGNIFICANT CHANGE UP (ref 7–23)
CALCIUM SERPL-MCNC: 8.7 MG/DL — SIGNIFICANT CHANGE UP (ref 8.5–10.1)
CHLORIDE SERPL-SCNC: 107 MMOL/L — SIGNIFICANT CHANGE UP (ref 96–108)
CO2 SERPL-SCNC: 28 MMOL/L — SIGNIFICANT CHANGE UP (ref 22–31)
CREAT SERPL-MCNC: 1.05 MG/DL — SIGNIFICANT CHANGE UP (ref 0.5–1.3)
EGFR: 78 ML/MIN/1.73M2 — SIGNIFICANT CHANGE UP
GLUCOSE SERPL-MCNC: 135 MG/DL — HIGH (ref 70–99)
GRAM STN FLD: SIGNIFICANT CHANGE UP
HCT VFR BLD CALC: 38.7 % — LOW (ref 39–50)
HGB BLD-MCNC: 12.5 G/DL — LOW (ref 13–17)
MCHC RBC-ENTMCNC: 27.2 PG — SIGNIFICANT CHANGE UP (ref 27–34)
MCHC RBC-ENTMCNC: 32.3 G/DL — SIGNIFICANT CHANGE UP (ref 32–36)
MCV RBC AUTO: 84.1 FL — SIGNIFICANT CHANGE UP (ref 80–100)
NRBC # BLD: 0 /100 WBCS — SIGNIFICANT CHANGE UP (ref 0–0)
PLATELET # BLD AUTO: 273 K/UL — SIGNIFICANT CHANGE UP (ref 150–400)
POTASSIUM SERPL-MCNC: 4.2 MMOL/L — SIGNIFICANT CHANGE UP (ref 3.5–5.3)
POTASSIUM SERPL-SCNC: 4.2 MMOL/L — SIGNIFICANT CHANGE UP (ref 3.5–5.3)
RBC # BLD: 4.6 M/UL — SIGNIFICANT CHANGE UP (ref 4.2–5.8)
RBC # FLD: 13.1 % — SIGNIFICANT CHANGE UP (ref 10.3–14.5)
SODIUM SERPL-SCNC: 139 MMOL/L — SIGNIFICANT CHANGE UP (ref 135–145)
SPECIMEN SOURCE: SIGNIFICANT CHANGE UP
WBC # BLD: 8.79 K/UL — SIGNIFICANT CHANGE UP (ref 3.8–10.5)
WBC # FLD AUTO: 8.79 K/UL — SIGNIFICANT CHANGE UP (ref 3.8–10.5)

## 2024-03-08 RX ORDER — APIXABAN 2.5 MG/1
1 TABLET, FILM COATED ORAL
Qty: 28 | Refills: 0
Start: 2024-03-08 | End: 2024-03-21

## 2024-03-08 RX ADMIN — TRAMADOL HYDROCHLORIDE 50 MILLIGRAM(S): 50 TABLET ORAL at 00:40

## 2024-03-08 RX ADMIN — LISINOPRIL 20 MILLIGRAM(S): 2.5 TABLET ORAL at 05:56

## 2024-03-08 RX ADMIN — Medication 1000 MILLIGRAM(S): at 14:20

## 2024-03-08 RX ADMIN — Medication 1000 MILLIGRAM(S): at 15:13

## 2024-03-08 RX ADMIN — Medication 1000 MILLIGRAM(S): at 05:56

## 2024-03-08 RX ADMIN — Medication 1 TABLET(S): at 11:31

## 2024-03-08 RX ADMIN — APIXABAN 2.5 MILLIGRAM(S): 2.5 TABLET, FILM COATED ORAL at 08:06

## 2024-03-08 RX ADMIN — Medication 100 MILLIGRAM(S): at 05:55

## 2024-03-08 RX ADMIN — SODIUM CHLORIDE 75 MILLILITER(S): 9 INJECTION, SOLUTION INTRAVENOUS at 05:56

## 2024-03-08 RX ADMIN — Medication 1000 MILLIGRAM(S): at 06:50

## 2024-03-08 RX ADMIN — Medication 1 MILLIGRAM(S): at 11:31

## 2024-03-08 RX ADMIN — CELECOXIB 200 MILLIGRAM(S): 200 CAPSULE ORAL at 06:50

## 2024-03-08 RX ADMIN — PANTOPRAZOLE SODIUM 40 MILLIGRAM(S): 20 TABLET, DELAYED RELEASE ORAL at 06:04

## 2024-03-08 RX ADMIN — CELECOXIB 200 MILLIGRAM(S): 200 CAPSULE ORAL at 05:56

## 2024-03-08 NOTE — PROGRESS NOTE ADULT - SUBJECTIVE AND OBJECTIVE BOX
Pt seen at bedside this AM. No acute complaints, pain is well managed. Denies numbness, tingling, fevers, chills, CP, SOB, N/V/D.    Vital Signs (24 Hrs):  T(C): 36.5 (03-08-24 @ 05:00), Max: 36.8 (03-07-24 @ 11:47)  HR: 66 (03-08-24 @ 05:00) (64 - 80)  BP: 163/98 (03-08-24 @ 05:00) (120/75 - 167/86)  RR: 18 (03-08-24 @ 05:00) (9 - 18)  SpO2: 99% (03-08-24 @ 05:00) (96% - 100%)  Wt(kg): --    LABS:                          12.5   6.57  )-----------( 241      ( 07 Mar 2024 17:00 )             38.0     03-07    141  |  110<H>  |  11  ----------------------------<  113<H>  4.0   |  27  |  0.99    Ca    8.5      07 Mar 2024 17:00    Physical Exam:  General: NAD, pt laying in bed comfortably  SCDs in place BL    RLE:  Dressings C/D/I  Hemovac drain in place  Compartments soft, compressible  Calves nontender  Motor: +GSC, TA, EHL, FHL  SILT: SPN, DPN, Tib, Saph, Gely  +DP    A/P: 69M POD1 Rev R TKA    WBAT  PT/OT  Follow up AM labs  Followup OR cultures  Analgesics  Record drain output, will pull drain at 1200 today  Continue postop ABx for 24 hrs  DVT PPx start Eliquis POD1   Incentive spirometry  Dispo: Home per PT eval  Appreciate medical recs  Will discuss plan with Dr. Hanson and adjust plan as necessary

## 2024-03-08 NOTE — OCCUPATIONAL THERAPY INITIAL EVALUATION ADULT - ADDITIONAL COMMENTS
Prior to admission, pt was functioning in his roles, self sufficient & ambulating independently without any assistive devices. Presently, pt needs assistance with lower body self care tasks due to pain, weakness, stiffness and decreased ROM from right TKR. Pt is right hand dominant and wears glasses for reading.

## 2024-03-08 NOTE — OCCUPATIONAL THERAPY INITIAL EVALUATION ADULT - LIVES WITH, PROFILE
in a private house with 3 entry steps equiiped with bilateral hand rails that cannot be reached simultaneously . All living amenities are located on one level. The bathroom has a walk in shower, fixed / retractable shower head and comfort height toilet.  Pt differed a 3:1 commode./spouse

## 2024-03-08 NOTE — OCCUPATIONAL THERAPY INITIAL EVALUATION ADULT - PRECAUTIONS/LIMITATIONS, REHAB EVAL
total knee protocol. No towels or pillows should be placed under right knee, but should be placed under ankle/fall precautions

## 2024-03-08 NOTE — OCCUPATIONAL THERAPY INITIAL EVALUATION ADULT - PERTINENT HX OF CURRENT PROBLEM, REHAB EVAL
Pt is a 70 y/o male admitted for elective surgery for revision of right TKR, due to OA, unstable prosthesis , pain and DJD. Pt had initial right TKR in March 2021, ; pt feel sustained right hip fracture and this also impacted right knee prosthesis. Pt stated he had only 70 degrees of ROM in his right knee.

## 2024-03-08 NOTE — OCCUPATIONAL THERAPY INITIAL EVALUATION ADULT - PLANNED THERAPY INTERVENTIONS, OT EVAL
energy conservation techniques/ADL retraining/IADL retraining/balance training/bed mobility training/ROM/strengthening/stretching/transfer training

## 2024-03-08 NOTE — OCCUPATIONAL THERAPY INITIAL EVALUATION ADULT - RANGE OF MOTION EXAMINATION, LOWER EXTREMITY
AAROM in right knee is 0-60 degrees/Left LE Active ROM was WNL  (within normal limits)/Left LE Passive ROM was WNL (within normal limits)

## 2024-03-08 NOTE — DISCHARGE NOTE NURSING/CASE MANAGEMENT/SOCIAL WORK - PATIENT PORTAL LINK FT
You can access the FollowMyHealth Patient Portal offered by [facility] by registering at the following website: [email or web address]. By joining [employer]’s FollowMyHealth portal, you will also be able to view your health information using other applications (apps) compatible with our system.

## 2024-03-08 NOTE — OCCUPATIONAL THERAPY INITIAL EVALUATION ADULT - GENERAL OBSERVATIONS, REHAB EVAL
Pt was seen for initial OT consult, encountered OOB to chair in NAD. Hemovac drainage and prevena vac with ace bandage in place. Grad e+ edema is noted in RLE with stiffness and decreased ROM. Pt was AA&Ox4, cooperative & followed commands. Pt c/o right knee pain due to s/p revision TKR; this limits pt's activity tolerance ,balance, ADL management and functional mobility.

## 2024-03-08 NOTE — OCCUPATIONAL THERAPY INITIAL EVALUATION ADULT - NSOTDISCHREC_GEN_A_CORE
to prevent falls, optimize pt's ability for ADL management & safely navigate in all terrains . Pt has rolling walker and SAC. Pt differed 3:1 commode  since he has a comfort height toillet which he safely and independently demonstrated toilet transfer with rolling walker and no use of grab bars./Home OT

## 2024-03-11 LAB — SURGICAL PATHOLOGY STUDY: SIGNIFICANT CHANGE UP

## 2024-03-13 DIAGNOSIS — M24.661 ANKYLOSIS, RIGHT KNEE: ICD-10-CM

## 2024-03-13 DIAGNOSIS — T84.82XA FIBROSIS DUE TO INTERNAL ORTHOPEDIC PROSTHETIC DEVICES, IMPLANTS AND GRAFTS, INITIAL ENCOUNTER: ICD-10-CM

## 2024-03-13 DIAGNOSIS — Z91.013 ALLERGY TO SEAFOOD: ICD-10-CM

## 2024-03-13 DIAGNOSIS — T84.032A MECHANICAL LOOSENING OF INTERNAL RIGHT KNEE PROSTHETIC JOINT, INITIAL ENCOUNTER: ICD-10-CM
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
## 2024-03-13 DIAGNOSIS — G47.33 OBSTRUCTIVE SLEEP APNEA (ADULT) (PEDIATRIC): ICD-10-CM

## 2024-03-13 DIAGNOSIS — I10 ESSENTIAL (PRIMARY) HYPERTENSION: ICD-10-CM

## 2024-03-15 ENCOUNTER — NON-APPOINTMENT (OUTPATIENT)
Age: 69
End: 2024-03-15

## 2024-03-15 LAB
-  AMPICILLIN/SULBACTAM: SIGNIFICANT CHANGE UP
-  CEFAZOLIN: SIGNIFICANT CHANGE UP
-  CLINDAMYCIN: SIGNIFICANT CHANGE UP
-  ERYTHROMYCIN: SIGNIFICANT CHANGE UP
-  GENTAMICIN: SIGNIFICANT CHANGE UP
-  OXACILLIN: SIGNIFICANT CHANGE UP
-  PENICILLIN: SIGNIFICANT CHANGE UP
-  RIFAMPIN: SIGNIFICANT CHANGE UP
-  TETRACYCLINE: SIGNIFICANT CHANGE UP
-  TRIMETHOPRIM/SULFAMETHOXAZOLE: SIGNIFICANT CHANGE UP
-  VANCOMYCIN: SIGNIFICANT CHANGE UP
METHOD TYPE: SIGNIFICANT CHANGE UP

## 2024-03-21 LAB
CULTURE RESULTS: ABNORMAL
CULTURE RESULTS: ABNORMAL
CULTURE RESULTS: SIGNIFICANT CHANGE UP
CULTURE RESULTS: SIGNIFICANT CHANGE UP
GRAM STN FLD: ABNORMAL
GRAM STN FLD: ABNORMAL
GRAM STN FLD: SIGNIFICANT CHANGE UP
GRAM STN FLD: SIGNIFICANT CHANGE UP
ORGANISM # SPEC MICROSCOPIC CNT: ABNORMAL
ORGANISM # SPEC MICROSCOPIC CNT: SIGNIFICANT CHANGE UP
SPECIMEN SOURCE: SIGNIFICANT CHANGE UP

## 2024-03-22 RX ORDER — ASPIRIN/CALCIUM CARB/MAGNESIUM 324 MG
1 TABLET ORAL
Qty: 60 | Refills: 0
Start: 2024-03-22 | End: 2024-04-20

## 2024-03-27 ENCOUNTER — APPOINTMENT (OUTPATIENT)
Dept: ORTHOPEDIC SURGERY | Facility: CLINIC | Age: 69
End: 2024-03-27
Payer: COMMERCIAL

## 2024-03-27 PROBLEM — G47.33 OBSTRUCTIVE SLEEP APNEA (ADULT) (PEDIATRIC): Chronic | Status: ACTIVE | Noted: 2024-02-16

## 2024-03-27 PROBLEM — M51.26 OTHER INTERVERTEBRAL DISC DISPLACEMENT, LUMBAR REGION: Chronic | Status: ACTIVE | Noted: 2024-03-07

## 2024-03-27 PROCEDURE — 99024 POSTOP FOLLOW-UP VISIT: CPT

## 2024-03-27 RX ORDER — CYCLOBENZAPRINE HYDROCHLORIDE 5 MG/1
5 TABLET, FILM COATED ORAL 3 TIMES DAILY
Qty: 30 | Refills: 0 | Status: ACTIVE | COMMUNITY
Start: 2024-03-27 | End: 1900-01-01

## 2024-03-27 NOTE — PROCEDURE
[de-identified] : Observation on incision dry, clean, intact, well healed. Method staple removing kit. Suture site Cleaned with iodine swab after sutures are completely removed. Instructions Keep incision dry and clean, allowed to shower and pat site dry, do not rub dry, contact office is site becomes red, swollen, infected, or you develop a fever.

## 2024-03-27 NOTE — HISTORY OF PRESENT ILLNESS
[Healed] : healed [Clean/Dry/Intact] : clean, dry and intact [Swelling] : swollen [Neuro Intact] : an unremarkable neurological exam [Vascular Intact] : ~T peripheral vascular exam normal [Negative Milka's] : maneuvers demonstrated a negative Milka's sign [Doing Well] : is doing well [No Sign of Infection] : is showing no signs of infection [Adequate Pain Control] : has adequate pain control [Staples Removed] : staples were removed [Chills] : no chills [Constipation] : no constipation [Diarrhea] : no diarrhea [Dysuria] : no dysuria [Fever] : no fever [Nausea] : no nausea [Vomiting] : no vomiting [Erythema] : not erythematous [Discharge] : absent of discharge [Dehiscence] : not dehisced [de-identified] : Patient presents today for F/U S/P Right Revision TKR done 3 weeks ago. Patient is doing well and undergoing P.T. with improvement. Takes pain meds and DVT prophylaxis. I went over post-op care and answered all questions. I also provided patient with surgical report for their records. [de-identified] : ROM 0-90 degrees [de-identified] : Continue P.T. pain management and DVT prophylaxis. F/U in 1 month with x-rays.

## 2024-04-19 ENCOUNTER — NON-APPOINTMENT (OUTPATIENT)
Age: 69
End: 2024-04-19

## 2024-04-22 ENCOUNTER — APPOINTMENT (OUTPATIENT)
Dept: ORTHOPEDIC SURGERY | Facility: CLINIC | Age: 69
End: 2024-04-22
Payer: COMMERCIAL

## 2024-04-22 VITALS — WEIGHT: 162 LBS | HEIGHT: 68 IN | BODY MASS INDEX: 24.55 KG/M2

## 2024-04-22 DIAGNOSIS — Z96.651 PRESENCE OF RIGHT ARTIFICIAL KNEE JOINT: ICD-10-CM

## 2024-04-22 DIAGNOSIS — T84.82XA FIBROSIS DUE TO INTERNAL ORTHOPEDIC PROSTHETIC DEVICES, IMPLANTS AND GRAFTS, INITIAL ENCOUNTER: ICD-10-CM

## 2024-04-22 PROCEDURE — 73562 X-RAY EXAM OF KNEE 3: CPT | Mod: RT

## 2024-04-22 PROCEDURE — 99024 POSTOP FOLLOW-UP VISIT: CPT

## 2024-04-22 RX ORDER — MELOXICAM 7.5 MG/1
7.5 TABLET ORAL DAILY
Qty: 30 | Refills: 0 | Status: ACTIVE | COMMUNITY
Start: 2024-04-22 | End: 1900-01-01

## 2024-04-22 NOTE — ADDENDUM
[FreeTextEntry1] : This note was written by Arianne Negrete on 04/22/2024 acting as scribe for Dr. Baljit Hanson M.D.   I, Dr. Baljit Hanson, have read and attest that all the information, medical decision making and discharge instructions within are true and accurate.

## 2024-04-22 NOTE — CONSULT LETTER
[Dear  ___] : Dear  [unfilled], [Consult Letter:] : I had the pleasure of evaluating your patient, [unfilled]. [Please see my note below.] : Please see my note below. [Consult Closing:] : Thank you very much for allowing me to participate in the care of this patient.  If you have any questions, please do not hesitate to contact me. [Sincerely,] : Sincerely, [FreeTextEntry3] : Dr. Baljit Hanson

## 2024-04-22 NOTE — REASON FOR VISIT
[Post-Operative Visit] : a post-operative visit for [Total Knee Replacement Revision Surgery, Aftercare] : total knee replacement revision surgery, aftercare

## 2024-04-22 NOTE — DISCUSSION/SUMMARY
[de-identified] : Patient is doing very well and very happy following their s/p Right revision TKR. I reviewed x-rays with them and compared to prior films. I have reassured them that their implants are functioning well. All questions answered, understanding verbalized. He is encouraged to continue to stay active with a home exercise program.   I will see them back in 6-8 weeks with x-rays.

## 2024-04-22 NOTE — PHYSICAL EXAM
[de-identified] : General appearance: well-nourished and hydrated, pleasant, alert and oriented x 3, cooperative. HEENT: Normocephalic, EOM intact, Nasal septum midline, Oral cavity clear, External auditory canal clear. Cardiovascular: no apparent abnormalities, no lower leg edema, no varicosities, pedal pulses are palpable. Lymphatics Lymph nodes: none palpated, Lymphedema: not present. Neurologic: sensation is normal, no muscle weakness in upper or lower extremities, patella tendon reflexes intact. Dermatologic no apparent skin lesions, moist, warm, no rash. Spine: cervical spine appears normal and moves freely, thoracic spine appears normal and moves freely, lumbosacral spine appears normal and moves freely. Gait: nonantalgic.    Right Knee Inspection: minimal soft tissue swelling Wounds: healed midline incision  Alignment: normal. Palpation: no specific tenderness on palpation. ROM: Active (in degrees): 0-90 Ligamentous laxity (neg): negative ant. drawer test, negative post. drawer test, stable to varus stress test, stable to valgus stress test, Patellofemoral Alignment Test: Q angle-, normal. Muscle Test: good quad strength. Leg examination: calf is soft and non-tender.  [de-identified] : Right knee x-ray, AP, lateral, merchant view taken at the office today demonstrates a total knee replacement in satisfactory position and alignment. No evidence of loosening. The patella sits in a central position. Stem femoral component.   Left knee x-ray merchant view taken at the office today demonstrates good joint space and a well centered patella.

## 2024-04-22 NOTE — HISTORY OF PRESENT ILLNESS
[de-identified] : NYDIA ZAIDI 69 year old male presents for evaluation of right revision at 6 weeks. He is attending PT x3 a week. His pre-operative ROM was 0-70 and his therapist got 0-90 which he states is happy about that. Patient is getting back into activities and goes to work x3 a week. He states he did 10,000 steps yesterday. He ices and elevates the knee. Patient only takes Oxycodone prior to PT sessions. Overall, he is very happy with experience.

## 2024-07-26 ENCOUNTER — APPOINTMENT (OUTPATIENT)
Dept: ORTHOPEDIC SURGERY | Facility: CLINIC | Age: 69
End: 2024-07-26
Payer: COMMERCIAL

## 2024-07-26 VITALS — HEIGHT: 68 IN | BODY MASS INDEX: 25.01 KG/M2 | WEIGHT: 165 LBS

## 2024-07-26 DIAGNOSIS — Z96.651 PRESENCE OF RIGHT ARTIFICIAL KNEE JOINT: ICD-10-CM

## 2024-07-26 PROCEDURE — G2211 COMPLEX E/M VISIT ADD ON: CPT

## 2024-07-26 PROCEDURE — 73562 X-RAY EXAM OF KNEE 3: CPT | Mod: RT

## 2024-07-26 PROCEDURE — 99213 OFFICE O/P EST LOW 20 MIN: CPT

## 2024-07-26 NOTE — HISTORY OF PRESENT ILLNESS
[de-identified] :  NYDIA ZAIDI 69 year old male presents for follow up evaluation of a s/p right revision arthroplasty from March 7th, 2024. His pre-op range of motion was 0-70 and now he states he can get it 0-90. He states the knee feels stable and has no pain. He has completed physical therapy and is exercising at home.

## 2024-07-26 NOTE — DISCUSSION/SUMMARY
[de-identified] : Patient is doing very well and very happy following their s/p Right revision TKR. I reviewed x-rays with them and compared to prior films. He continues to have improvement to his range of motion following his revision. KOOS score was obtained. I have reassured them that their implants are functioning well. All questions answered, understanding verbalized. He is encouraged to continue to stay active with a home exercise program.   I will see them back in 3 months with x-rays.

## 2024-07-26 NOTE — ADDENDUM
[FreeTextEntry1] : This note was written by Mohsen Flores Jr. on 07/26/2024 acting as scribe for Dr. Baljit Hanson M.D.   I, Dr. Baljit Hanson, have read and attest that all the information, medical decision making and discharge instructions within are true and accurate

## 2024-07-26 NOTE — PHYSICAL EXAM
[de-identified] : General appearance: well-nourished and hydrated, pleasant, alert and oriented x 3, cooperative. HEENT: Normocephalic, EOM intact, Nasal septum midline, Oral cavity clear, External auditory canal clear. Cardiovascular: no apparent abnormalities, no lower leg edema, no varicosities, pedal pulses are palpable. Lymphatics Lymph nodes: none palpated, Lymphedema: not present. Neurologic: sensation is normal, no muscle weakness in upper or lower extremities, patella tendon reflexes intact. Dermatologic no apparent skin lesions, moist, warm, no rash. Spine: cervical spine appears normal and moves freely, thoracic spine appears normal and moves freely, lumbosacral spine appears normal and moves freely. Gait: nonantalgic.    Right Knee Inspection: minimal soft tissue swelling Wounds: healed midline incision  Alignment: normal. Palpation: no specific tenderness on palpation. ROM: Active (in degrees): 0-90 Ligamentous laxity (neg): negative ant. drawer test, negative post. drawer test, stable to varus stress test, stable to valgus stress test, Patellofemoral Alignment Test: Q angle-, normal. Muscle Test: good quad strength. Leg examination: calf is soft and non-tender.  [de-identified] : Right knee x-ray, AP, lateral, merchant view taken at the office today demonstrates a total knee replacement in satisfactory position and alignment. No evidence of loosening. The patella sits in a central position. Stem femoral component.   Left knee x-ray merchant view taken at the office today demonstrates good joint space and a well centered patella.

## 2024-09-18 ENCOUNTER — NON-APPOINTMENT (OUTPATIENT)
Age: 69
End: 2024-09-18

## 2024-10-02 ENCOUNTER — OFFICE (OUTPATIENT)
Dept: URBAN - METROPOLITAN AREA CLINIC 6 | Facility: CLINIC | Age: 69
Setting detail: OPHTHALMOLOGY
End: 2024-10-02
Payer: COMMERCIAL

## 2024-10-02 DIAGNOSIS — H25.13: ICD-10-CM

## 2024-10-02 PROBLEM — H52.13 S/P LASIK; BOTH EYES: Status: ACTIVE | Noted: 2024-10-02

## 2024-10-02 PROCEDURE — 92004 COMPRE OPH EXAM NEW PT 1/>: CPT | Performed by: OPHTHALMOLOGY

## 2024-10-02 ASSESSMENT — VISUAL ACUITY
OS_BCVA: 20/25-2
OD_BCVA: 20/20-

## 2024-10-02 ASSESSMENT — REFRACTION_CURRENTRX
OS_VPRISM_DIRECTION: PROGS
OS_CYLINDER: -0.75
OD_VPRISM_DIRECTION: PROGS
OS_ADD: +2.25
OS_OVR_VA: 20/
OS_SPHERE: +2.25
OD_ADD: +2.25
OS_AXIS: 086
OD_OVR_VA: 20/
OD_AXIS: 079
OD_SPHERE: +2.25
OD_CYLINDER: -1.00

## 2024-10-02 ASSESSMENT — REFRACTION_AUTOREFRACTION
OS_CYLINDER: -2.50
OD_SPHERE: +3.50
OS_AXIS: 091
OD_AXIS: 093
OS_SPHERE: +4.00
OD_CYLINDER: -2.75

## 2024-10-02 ASSESSMENT — KERATOMETRY
OS_AXISANGLE_DEGREES: 001
OD_K2POWER_DIOPTERS: 42.50
OD_AXISANGLE_DEGREES: 008
OS_K2POWER_DIOPTERS: 42.00
OS_K1POWER_DIOPTERS: 40.25
OD_K1POWER_DIOPTERS: 40.75

## 2024-10-02 ASSESSMENT — CONFRONTATIONAL VISUAL FIELD TEST (CVF)
OD_FINDINGS: FULL
OS_FINDINGS: FULL

## 2024-10-02 ASSESSMENT — TONOMETRY
OD_IOP_MMHG: 13
OS_IOP_MMHG: 15

## 2024-10-25 ENCOUNTER — APPOINTMENT (OUTPATIENT)
Dept: ORTHOPEDIC SURGERY | Facility: CLINIC | Age: 69
End: 2024-10-25

## 2025-01-16 ENCOUNTER — NON-APPOINTMENT (OUTPATIENT)
Age: 70
End: 2025-01-16

## 2025-01-23 NOTE — OCCUPATIONAL THERAPY INITIAL EVALUATION ADULT - NS ASR FOLLOW COMMAND OT EVAL
Wrist Watch/Cell Phone/PDA (specify)/Clothing 100% of the time/able to follow multistep instructions

## (undated) DEVICE — ZIMMER MIXING BOWL WITH SPATULA

## (undated) DEVICE — SAW BLADE STRYKER SAGITTAL DUAL CUT 75X18X1.27MM

## (undated) DEVICE — DRAPE 3/4 SHEET W REINFORCEMENT 56X77"

## (undated) DEVICE — SUT VICRYL 2-0 27" CT-2 UNDYED

## (undated) DEVICE — DRAPE SHOWER CURTAIN ISOLATION

## (undated) DEVICE — ELCTR STRYKER NEPTUNE SMOKE EVACUATION PENCIL (GREEN)

## (undated) DEVICE — DRAPE INSTRUMENT POUCH 6.75" X 11"

## (undated) DEVICE — OSTEOTOME 10MM X 2.5IN

## (undated) DEVICE — DRSG AQUACEL 3.5 X 10"

## (undated) DEVICE — SYR LUER LOK 50CC

## (undated) DEVICE — SAW BLADE LINVATEC OSCILLATING 9.5X25X0.4MM

## (undated) DEVICE — SAW BLADE LINVATEC SAGITTAL MIC 9.5X25.5X0.4MM

## (undated) DEVICE — BLADE SURGICAL #15 CARBON

## (undated) DEVICE — SYR LUER LOK 10CC

## (undated) DEVICE — DRAPE TOWEL BLUE 17" X 24"

## (undated) DEVICE — HOOD FLYTE STRYKER HELMET SHIELD

## (undated) DEVICE — GLV 9 PROTEXIS (WHITE)

## (undated) DEVICE — SAW BLADE LINVATEC HALL 13X90X1.27MM

## (undated) DEVICE — SOL IRR BAG NS 0.9% 3000ML

## (undated) DEVICE — GLV 9 PROTEXIS ORTHO (BROWN)

## (undated) DEVICE — DRSG WEBRIL 6"

## (undated) DEVICE — PREP CHLORAPREP HI-LITE ORANGE 26ML

## (undated) DEVICE — DRAPE EXTREMITY 87" X 128.5"

## (undated) DEVICE — TOURNIQUET ESMARK 6"

## (undated) DEVICE — DRAPE STERI-DRAPE INCISE 32X33"

## (undated) DEVICE — PACK BASIC

## (undated) DEVICE — STAPLER SKIN MULTI DIRECTION W35

## (undated) DEVICE — MARKING PEN W RULER

## (undated) DEVICE — MEDICATION LABELS W MARKER

## (undated) DEVICE — ZIMMER BLADE PATELLA REAMER 35MM

## (undated) DEVICE — FRA-ESU BOVIE FORCE TRIAD T6D04548DX: Type: DURABLE MEDICAL EQUIPMENT

## (undated) DEVICE — BLADE OSTEOTOME FLEX 10MMX1.5"

## (undated) DEVICE — NDL HYPO SAFE 22G X 1.5" (BLACK)

## (undated) DEVICE — FRA-TOURNIQUET 402010120020: Type: DURABLE MEDICAL EQUIPMENT

## (undated) DEVICE — ELCTR GROUNDING PAD ADULT COVIDIEN

## (undated) DEVICE — ZIMMER PULSAVAC PLUS FAN KIT

## (undated) DEVICE — DRAIN JACKSON PRATT 3 SPRING RESERVOIR W 10FR PVC DRAIN

## (undated) DEVICE — BLADE SURGICAL #20 CARBON

## (undated) DEVICE — SUCTION YANKAUER NO CONTROL VENT

## (undated) DEVICE — CRYO/CUFF GRAVITY COOLER KNEE LARGE

## (undated) DEVICE — BLADE OSTEOTOME FLEX 6MMX1.5"

## (undated) DEVICE — DRSG AQUACEL 3.5 X 14"

## (undated) DEVICE — DRSG AQUACEL 3.5 X 12"

## (undated) DEVICE — DRAPE STERI-DRAPE INCISE 19X17"

## (undated) DEVICE — SUT VICRYL 0 18" CT-1 UNDYED (POP-OFF)

## (undated) DEVICE — NDL HYPO SAFE 18G X 1.5" (PINK)

## (undated) DEVICE — ZIMMER BLADE PATELLA REAMER W PILOT HOLE SZ 32

## (undated) DEVICE — DRSG PREVENA PLUS SYSTEM